# Patient Record
Sex: MALE | Race: WHITE | NOT HISPANIC OR LATINO | URBAN - METROPOLITAN AREA
[De-identification: names, ages, dates, MRNs, and addresses within clinical notes are randomized per-mention and may not be internally consistent; named-entity substitution may affect disease eponyms.]

---

## 2023-02-23 LAB
ALANINE AMINOTRANSFERASE (SGPT) (U/L) IN SER/PLAS: 16 U/L (ref 10–52)
ALBUMIN (G/DL) IN SER/PLAS: 5.4 G/DL (ref 3.4–5)
ALKALINE PHOSPHATASE (U/L) IN SER/PLAS: 83 U/L (ref 33–120)
ANION GAP IN SER/PLAS: 13 MMOL/L (ref 10–20)
ASPARTATE AMINOTRANSFERASE (SGOT) (U/L) IN SER/PLAS: 17 U/L (ref 9–39)
BILIRUBIN TOTAL (MG/DL) IN SER/PLAS: 0.6 MG/DL (ref 0–1.2)
CALCIDIOL (25 OH VITAMIN D3) (NG/ML) IN SER/PLAS: 17 NG/ML
CALCIUM (MG/DL) IN SER/PLAS: 10 MG/DL (ref 8.6–10.6)
CARBON DIOXIDE, TOTAL (MMOL/L) IN SER/PLAS: 29 MMOL/L (ref 21–32)
CHLORIDE (MMOL/L) IN SER/PLAS: 103 MMOL/L (ref 98–107)
CHOLESTEROL (MG/DL) IN SER/PLAS: 258 MG/DL (ref 0–199)
CHOLESTEROL IN HDL (MG/DL) IN SER/PLAS: 83.8 MG/DL
CHOLESTEROL/HDL RATIO: 3.1
CREATININE (MG/DL) IN SER/PLAS: 0.96 MG/DL (ref 0.5–1.3)
ERYTHROCYTE DISTRIBUTION WIDTH (RATIO) BY AUTOMATED COUNT: 11.9 % (ref 11.5–14.5)
ERYTHROCYTE MEAN CORPUSCULAR HEMOGLOBIN CONCENTRATION (G/DL) BY AUTOMATED: 34.3 G/DL (ref 32–36)
ERYTHROCYTE MEAN CORPUSCULAR VOLUME (FL) BY AUTOMATED COUNT: 95 FL (ref 80–100)
ERYTHROCYTES (10*6/UL) IN BLOOD BY AUTOMATED COUNT: 5.04 X10E12/L (ref 4.5–5.9)
GFR MALE: >90 ML/MIN/1.73M2
GLUCOSE (MG/DL) IN SER/PLAS: 103 MG/DL (ref 74–99)
HEMATOCRIT (%) IN BLOOD BY AUTOMATED COUNT: 47.8 % (ref 41–52)
HEMOGLOBIN (G/DL) IN BLOOD: 16.4 G/DL (ref 13.5–17.5)
LDL: 159 MG/DL (ref 0–99)
LEUKOCYTES (10*3/UL) IN BLOOD BY AUTOMATED COUNT: 8.6 X10E9/L (ref 4.4–11.3)
NRBC (PER 100 WBCS) BY AUTOMATED COUNT: 0 /100 WBC (ref 0–0)
PLATELETS (10*3/UL) IN BLOOD AUTOMATED COUNT: 278 X10E9/L (ref 150–450)
POTASSIUM (MMOL/L) IN SER/PLAS: 4.6 MMOL/L (ref 3.5–5.3)
PROTEIN TOTAL: 7.6 G/DL (ref 6.4–8.2)
SODIUM (MMOL/L) IN SER/PLAS: 140 MMOL/L (ref 136–145)
THYROTROPIN (MIU/L) IN SER/PLAS BY DETECTION LIMIT <= 0.05 MIU/L: 1.54 MIU/L (ref 0.44–3.98)
TRIGLYCERIDE (MG/DL) IN SER/PLAS: 76 MG/DL (ref 0–149)
UREA NITROGEN (MG/DL) IN SER/PLAS: 13 MG/DL (ref 6–23)
VLDL: 15 MG/DL (ref 0–40)

## 2023-03-04 PROBLEM — R37 SEXUAL DYSFUNCTION: Status: ACTIVE | Noted: 2023-03-04

## 2023-03-04 PROBLEM — K21.9 LARYNGOPHARYNGEAL REFLUX (LPR): Status: ACTIVE | Noted: 2023-03-04

## 2023-03-04 PROBLEM — M25.561 ACUTE PAIN OF RIGHT KNEE: Status: ACTIVE | Noted: 2023-03-04

## 2023-03-04 PROBLEM — S76.219A GROIN STRAIN: Status: ACTIVE | Noted: 2023-03-04

## 2023-03-04 PROBLEM — G89.29 CHRONIC LEFT SHOULDER PAIN: Status: ACTIVE | Noted: 2023-03-04

## 2023-03-04 PROBLEM — R97.20 ELEVATED PROSTATE SPECIFIC ANTIGEN (PSA): Status: ACTIVE | Noted: 2023-03-04

## 2023-03-04 PROBLEM — D22.9 BENIGN MOLE: Status: ACTIVE | Noted: 2023-03-04

## 2023-03-04 PROBLEM — J30.9 ALLERGIC RHINITIS: Status: ACTIVE | Noted: 2023-03-04

## 2023-03-04 PROBLEM — M79.643 HAND PAIN: Status: ACTIVE | Noted: 2023-03-04

## 2023-03-04 PROBLEM — S69.92XA FINGER INJURY, LEFT, INITIAL ENCOUNTER: Status: ACTIVE | Noted: 2023-03-04

## 2023-03-04 PROBLEM — R33.9 INCOMPLETE EMPTYING OF BLADDER: Status: ACTIVE | Noted: 2023-03-04

## 2023-03-04 PROBLEM — M25.512 CHRONIC LEFT SHOULDER PAIN: Status: ACTIVE | Noted: 2023-03-04

## 2023-03-04 PROBLEM — M79.645 PAIN OF FINGER OF LEFT HAND: Status: ACTIVE | Noted: 2023-03-04

## 2023-03-04 PROBLEM — R39.9 URINARY SYMPTOM OR SIGN: Status: ACTIVE | Noted: 2023-03-04

## 2023-03-04 PROBLEM — R06.09 DYSPNEA ON EXERTION: Status: ACTIVE | Noted: 2023-03-04

## 2023-03-04 RX ORDER — SILDENAFIL 50 MG/1
50 TABLET, FILM COATED ORAL
COMMUNITY
Start: 2021-04-22

## 2023-03-07 ENCOUNTER — OFFICE VISIT (OUTPATIENT)
Dept: PRIMARY CARE | Facility: CLINIC | Age: 43
End: 2023-03-07
Payer: COMMERCIAL

## 2023-03-07 VITALS
SYSTOLIC BLOOD PRESSURE: 120 MMHG | DIASTOLIC BLOOD PRESSURE: 70 MMHG | WEIGHT: 178 LBS | BODY MASS INDEX: 26.36 KG/M2 | HEIGHT: 69 IN

## 2023-03-07 DIAGNOSIS — E78.2 MIXED HYPERLIPIDEMIA: Primary | ICD-10-CM

## 2023-03-07 DIAGNOSIS — E55.9 VITAMIN D DEFICIENCY: ICD-10-CM

## 2023-03-07 PROCEDURE — 99213 OFFICE O/P EST LOW 20 MIN: CPT | Performed by: INTERNAL MEDICINE

## 2023-03-07 RX ORDER — ERGOCALCIFEROL 1.25 MG/1
50000 CAPSULE ORAL
Qty: 6 CAPSULE | Refills: 0 | Status: SHIPPED | OUTPATIENT
Start: 2023-03-07 | End: 2023-04-18

## 2023-03-08 NOTE — PROGRESS NOTES
"Subjective   Patient ID: Marty Pierce is a 43 y.o. male who presents for Follow-up.    HPI     Patient is here for follow-up on blood work  To quit smoking only socially smokes  Father had high cholesterol and throat cancer  Patient always had high cholesterol    Past recap  Patient is here for physical  Also wants refill on Viagra as he has performing issues  He traveled a lot last year he was drinking a lot not eating right and gained some weight now he has cut down on smoking has cut down on drinking but still drinks 1 drink per day  Concerned about some moles on his chest     for physical   having performance issue   skin breakout on face from facemask   derm removed few skin spots   Patient has urinary bladder neck dysfunction, he has seen the urologist  They recommended Botox or surgery patient opted not to go for either  He manages it well by just giving extra shakes    Patient is here with complaints of right knee pain   He injured it while playing volleyball but it was recovered completely from that injury when  Friday night he was playing volleyball and in each him back he felt a sharp pain in the right knee since then he is having pain and swelling  Sunday he was walking up the stairs and he suddenly heard a snap in the knee in the back  Since then he is having difficulty walking because of the pain this no swelling in the knee he is able to ambulate but just feels the pain particularly on going up the stairs    SH : smokes , ETOH 2 beers / day   Occ :  , stay active   FH: F eosophageal cancer ,M CAD     not exrecising since last year     Review of Systems    Objective   /70   Ht 1.753 m (5' 9\")   Wt 80.7 kg (178 lb)   BMI 26.29 kg/m²     Physical Exam  Vitals reviewed.   Constitutional:       Appearance: Normal appearance.   HENT:      Head: Normocephalic and atraumatic.      Right Ear: Tympanic membrane, ear canal and external ear normal.      Left Ear: Tympanic membrane, ear " canal and external ear normal.      Nose: Nose normal.      Mouth/Throat:      Pharynx: Oropharynx is clear.   Eyes:      Extraocular Movements: Extraocular movements intact.      Conjunctiva/sclera: Conjunctivae normal.      Pupils: Pupils are equal, round, and reactive to light.   Cardiovascular:      Rate and Rhythm: Normal rate and regular rhythm.      Pulses: Normal pulses.      Heart sounds: Normal heart sounds.   Pulmonary:      Effort: Pulmonary effort is normal.      Breath sounds: Normal breath sounds.   Abdominal:      General: Abdomen is flat. Bowel sounds are normal.      Palpations: Abdomen is soft.   Musculoskeletal:      Cervical back: Normal range of motion and neck supple.   Skin:     General: Skin is warm and dry.   Neurological:      General: No focal deficit present.      Mental Status: He is alert and oriented to person, place, and time.   Psychiatric:         Mood and Affect: Mood normal.         Assessment/Plan   Problem List Items Addressed This Visit    None  Visit Diagnoses       Mixed hyperlipidemia    -  Primary    Vitamin D deficiency        Relevant Medications    ergocalciferol (Vitamin D-2) 1.25 MG (10107 UT) capsule          Blood work results reviewed  Cholesterol is high but patient is not willing to go on medication  We treat with diet and exercise  Vitamin D deficiency  Treat with vitamin D 50,000 q. weekly for 3 months then over-the-counter 2000  Follow-up for physical in a year

## 2023-09-14 ENCOUNTER — HOSPITAL ENCOUNTER (OUTPATIENT)
Dept: DATA CONVERSION | Facility: HOSPITAL | Age: 43
Discharge: HOME | End: 2023-09-14
Payer: COMMERCIAL

## 2023-09-14 DIAGNOSIS — R10.11 RIGHT UPPER QUADRANT PAIN: ICD-10-CM

## 2023-09-14 LAB
ALBUMIN SERPL-MCNC: 4.8 GM/DL (ref 3.5–5)
ALBUMIN/GLOB SERPL: 2 RATIO (ref 1.5–3)
ALP BLD-CCNC: 70 U/L (ref 35–125)
ALT SERPL-CCNC: 23 U/L (ref 5–40)
ANION GAP SERPL CALCULATED.3IONS-SCNC: 10 MMOL/L (ref 0–19)
AST SERPL-CCNC: 19 U/L (ref 5–40)
BASOPHILS # BLD AUTO: 0.06 K/UL (ref 0–0.22)
BASOPHILS NFR BLD AUTO: 0.7 % (ref 0–1)
BILIRUB SERPL-MCNC: 0.4 MG/DL (ref 0.1–1.2)
BUN SERPL-MCNC: 15 MG/DL (ref 8–25)
BUN/CREAT SERPL: 15 RATIO (ref 8–21)
CALCIUM SERPL-MCNC: 9.8 MG/DL (ref 8.5–10.4)
CHLORIDE SERPL-SCNC: 101 MMOL/L (ref 97–107)
CO2 SERPL-SCNC: 27 MMOL/L (ref 24–31)
CREAT SERPL-MCNC: 1 MG/DL (ref 0.4–1.6)
D DIMER PPP FEU-MCNC: 0.19 MG/L FEU (ref 0.19–0.5)
DEPRECATED RDW RBC AUTO: 42.2 FL (ref 37–54)
DIFFERENTIAL METHOD BLD: NORMAL
EOSINOPHIL # BLD AUTO: 0.03 K/UL (ref 0–0.45)
EOSINOPHIL NFR BLD: 0.4 % (ref 0–3)
ERYTHROCYTE [DISTWIDTH] IN BLOOD BY AUTOMATED COUNT: 12.1 % (ref 11.7–15)
GFR SERPL CREATININE-BSD FRML MDRD: 95 ML/MIN/1.73 M2
GLOBULIN SER-MCNC: 2.4 G/DL (ref 1.9–3.7)
GLUCOSE SERPL-MCNC: 115 MG/DL (ref 65–99)
HCT VFR BLD AUTO: 43.9 % (ref 41–50)
HGB BLD-MCNC: 15.4 GM/DL (ref 13.5–16.5)
IMM GRANULOCYTES # BLD AUTO: 0.02 K/UL (ref 0–0.1)
LIPASE SERPL-CCNC: 34 U/L (ref 16–63)
LYMPHOCYTES # BLD AUTO: 2.22 K/UL (ref 1.2–3.2)
LYMPHOCYTES NFR BLD MANUAL: 26.5 % (ref 20–40)
MCH RBC QN AUTO: 33.3 PG (ref 26–34)
MCHC RBC AUTO-ENTMCNC: 35.1 % (ref 31–37)
MCV RBC AUTO: 95 FL (ref 80–100)
MONOCYTES # BLD AUTO: 0.67 K/UL (ref 0–0.8)
MONOCYTES NFR BLD MANUAL: 8 % (ref 0–8)
NEUTROPHILS # BLD AUTO: 5.37 K/UL
NEUTROPHILS # BLD AUTO: 5.37 K/UL (ref 1.8–7.7)
NEUTROPHILS.IMMATURE NFR BLD: 0.2 % (ref 0–1)
NEUTS SEG NFR BLD: 64.2 % (ref 50–70)
NRBC BLD-RTO: 0 /100 WBC
PLATELET # BLD AUTO: 232 K/UL (ref 150–450)
PMV BLD AUTO: 9.4 CU (ref 7–12.6)
POTASSIUM SERPL-SCNC: 4 MMOL/L (ref 3.4–5.1)
PROT SERPL-MCNC: 7.2 G/DL (ref 5.9–7.9)
RBC # BLD AUTO: 4.62 M/UL (ref 4.5–5.5)
SODIUM SERPL-SCNC: 138 MMOL/L (ref 133–145)
WBC # BLD AUTO: 8.4 K/UL (ref 4.5–11)

## 2024-03-08 ENCOUNTER — OFFICE VISIT (OUTPATIENT)
Dept: PRIMARY CARE | Facility: CLINIC | Age: 44
End: 2024-03-08
Payer: COMMERCIAL

## 2024-03-08 VITALS
WEIGHT: 172.6 LBS | SYSTOLIC BLOOD PRESSURE: 130 MMHG | DIASTOLIC BLOOD PRESSURE: 82 MMHG | HEIGHT: 69 IN | BODY MASS INDEX: 25.56 KG/M2

## 2024-03-08 DIAGNOSIS — E55.9 VITAMIN D DEFICIENCY: ICD-10-CM

## 2024-03-08 DIAGNOSIS — R10.9 ABDOMINAL PAIN, UNSPECIFIED ABDOMINAL LOCATION: ICD-10-CM

## 2024-03-08 DIAGNOSIS — M54.50 ACUTE BILATERAL LOW BACK PAIN WITHOUT SCIATICA: ICD-10-CM

## 2024-03-08 DIAGNOSIS — R21 RASH: ICD-10-CM

## 2024-03-08 DIAGNOSIS — F32.89 OTHER DEPRESSION: ICD-10-CM

## 2024-03-08 PROCEDURE — 99214 OFFICE O/P EST MOD 30 MIN: CPT | Performed by: INTERNAL MEDICINE

## 2024-03-08 RX ORDER — NYSTATIN 100000 U/G
CREAM TOPICAL 2 TIMES DAILY
Qty: 30 G | Refills: 0 | Status: SHIPPED | OUTPATIENT
Start: 2024-03-08 | End: 2024-03-18

## 2024-03-08 RX ORDER — ERGOCALCIFEROL 1.25 MG/1
50000 CAPSULE ORAL
Qty: 6 CAPSULE | Refills: 0 | Status: SHIPPED | OUTPATIENT
Start: 2024-03-08 | End: 2024-04-19

## 2024-03-14 DIAGNOSIS — F32.89 OTHER DEPRESSION: ICD-10-CM

## 2024-03-14 RX ORDER — PAROXETINE 10 MG/1
10 TABLET, FILM COATED ORAL EVERY MORNING
Qty: 30 TABLET | Refills: 1 | Status: SHIPPED | OUTPATIENT
Start: 2024-03-14 | End: 2024-04-05 | Stop reason: SDUPTHER

## 2024-03-18 ENCOUNTER — OFFICE VISIT (OUTPATIENT)
Dept: GASTROENTEROLOGY | Facility: CLINIC | Age: 44
End: 2024-03-18
Payer: COMMERCIAL

## 2024-03-18 VITALS
RESPIRATION RATE: 18 BRPM | TEMPERATURE: 98.2 F | OXYGEN SATURATION: 99 % | WEIGHT: 176.26 LBS | DIASTOLIC BLOOD PRESSURE: 85 MMHG | HEART RATE: 79 BPM | BODY MASS INDEX: 26.03 KG/M2 | SYSTOLIC BLOOD PRESSURE: 125 MMHG

## 2024-03-18 DIAGNOSIS — G89.29 ABDOMINAL PAIN, CHRONIC, RIGHT UPPER QUADRANT: ICD-10-CM

## 2024-03-18 DIAGNOSIS — R13.19 ESOPHAGEAL DYSPHAGIA: ICD-10-CM

## 2024-03-18 DIAGNOSIS — Z12.11 ENCOUNTER FOR SCREENING COLONOSCOPY: Primary | ICD-10-CM

## 2024-03-18 DIAGNOSIS — K21.9 GASTROESOPHAGEAL REFLUX DISEASE, UNSPECIFIED WHETHER ESOPHAGITIS PRESENT: ICD-10-CM

## 2024-03-18 DIAGNOSIS — R11.10 DRY HEAVES: ICD-10-CM

## 2024-03-18 DIAGNOSIS — R10.11 ABDOMINAL PAIN, CHRONIC, RIGHT UPPER QUADRANT: ICD-10-CM

## 2024-03-18 DIAGNOSIS — R10.9 ABDOMINAL PAIN, UNSPECIFIED ABDOMINAL LOCATION: ICD-10-CM

## 2024-03-18 PROCEDURE — 1036F TOBACCO NON-USER: CPT | Performed by: NURSE PRACTITIONER

## 2024-03-18 PROCEDURE — 99204 OFFICE O/P NEW MOD 45 MIN: CPT | Performed by: NURSE PRACTITIONER

## 2024-03-18 RX ORDER — POLYETHYLENE GLYCOL 3350 17 G/17G
238 POWDER, FOR SOLUTION ORAL ONCE
Qty: 238 G | Refills: 0 | Status: SHIPPED | OUTPATIENT
Start: 2024-03-18 | End: 2024-03-18

## 2024-03-18 RX ORDER — OMEPRAZOLE 20 MG/1
20 CAPSULE, DELAYED RELEASE ORAL
Qty: 30 CAPSULE | Refills: 2 | Status: SHIPPED | OUTPATIENT
Start: 2024-03-18

## 2024-03-18 RX ORDER — CALCIUM CARBONATE 200(500)MG
1 TABLET,CHEWABLE ORAL AS NEEDED
COMMUNITY

## 2024-03-18 RX ORDER — MINERAL OIL
180 ENEMA (ML) RECTAL DAILY
COMMUNITY

## 2024-03-18 ASSESSMENT — ENCOUNTER SYMPTOMS
WEAKNESS: 0
AGITATION: 0
FLANK PAIN: 0
SHORTNESS OF BREATH: 0
JOINT SWELLING: 0
SORE THROAT: 0
COUGH: 0
MYALGIAS: 0
EYE PAIN: 0
HEADACHES: 0
HALLUCINATIONS: 0
ARTHRALGIAS: 0
FEVER: 0
DIZZINESS: 0
BACK PAIN: 0
NUMBNESS: 0
DIAPHORESIS: 0
DYSURIA: 0
PALPITATIONS: 0
PHOTOPHOBIA: 0
LIGHT-HEADEDNESS: 0
CHILLS: 0
WHEEZING: 0
HEMATURIA: 0
FREQUENCY: 0
ADENOPATHY: 0
FATIGUE: 0

## 2024-03-18 NOTE — PATIENT INSTRUCTIONS
Thanks for coming to the GI clinic.     Start omeprazole 20 mg once daily (30-60 minutes prior to breakfast).    You can use TUMS as needed.     I would like you to get an EGD.     You are due for a screening colonoscopy.   Please read all of the instructions 7 days before your colonoscopy.   You will need to take ONLY clear liquids the ENTIRE day before your procedure. These include (clear fruit juices, soda, Gatorade, broth, jello and coffee/tea) Avoid red and purple drinks. No cream or milk in the coffee.   You will need to take a bowel preparation.   You will also need a .      Please call [phone] to schedule the above procedures.     Moderate alcohol intake.     Tobacco can cause acid reflux.     Follow up 3 weeks after completion of the above.

## 2024-03-18 NOTE — PROGRESS NOTES
"Subjective   Patient ID: Marty Pierce is a 44 y.o. male who presents for colonoscopy consult and digestive issues.     This is a 44 year old WM with history of tobacco/daily alcohol use, depression, and seizures (none since 2005) who is presenting to the GI clinic for an initial visit.     History per pt and review of EMR    Denies prior EGD and colonoscopy.     Reports since last year he's been having RUQ abdominal pain lasting a whole day at a time. The pain is not related to food intake. The pain is much better than last year. The pain is worse with stress. He was seen in the ED last year for evaluation where gallbladder US was normal.     Reports recently starting Paxil and he is hopeful this may help his abdominal pain.     ROS positive for esophageal dysphagia to solids foods.     ROS positive for heartburn once a week which is controlled with TUMS. Heartburn is worse if he's lying flat. He has to sleep upright to prevent heartburn. He was on a PPI in the past.     He endorses a year's worth of dry heaving in the morning.     Denies unintentional weight loss, regurgitation, odynophagia, nausea, diarrhea, constipation, hematemesis, hematochezia, and melena.       Past medical history:   See above    Past surgical history:   Open repair with augmented reconstruction of left ulnar collateral ligament (2017 )     Family history:   Father- throat vs esophageal cancer from acid reflux     Social history:  Drinks 1-2 alcoholic beverages per day with \"a little more\" on the weekends  Quit smoking cigarettes 3-4 years ago   Smokes a cigar on occasion   Denies use of illicit drugs   Works a desk job as a        Review of Systems   Constitutional:  Negative for chills, diaphoresis, fatigue and fever.   HENT:  Negative for congestion, ear pain, hearing loss, sneezing and sore throat.    Eyes:  Negative for photophobia, pain and visual disturbance.   Respiratory:  Negative for cough, shortness of breath " and wheezing.    Cardiovascular:  Negative for chest pain, palpitations and leg swelling.   Endocrine: Negative for cold intolerance and heat intolerance.   Genitourinary:  Negative for dysuria, flank pain, frequency and hematuria.   Musculoskeletal:  Negative for arthralgias, back pain, gait problem, joint swelling and myalgias.   Skin:  Negative for rash.   Neurological:  Negative for dizziness, syncope, weakness, light-headedness, numbness and headaches.   Hematological:  Negative for adenopathy.   Psychiatric/Behavioral:  Negative for agitation and hallucinations.        Allergies   Allergen Reactions    Grass Pollen Other     Eyes itching, nose running       Current Outpatient Medications   Medication Sig Dispense Refill    calcium carbonate (Tums) 200 mg calcium chewable tablet Chew 1 tablet (500 mg) if needed for indigestion or heartburn.      ergocalciferol (Vitamin D-2) 1.25 MG (68193 UT) capsule Take 1 capsule (50,000 Units) by mouth 1 (one) time per week. 6 capsule 0    fexofenadine (Allegra) 180 mg tablet Take 1 tablet (180 mg) by mouth once daily.      omeprazole (PriLOSEC) 20 mg DR capsule Take 1 capsule (20 mg) by mouth once daily in the morning. Take before meals. To be taken 30-60 minutes prior to breakfast. Do not crush or chew. 30 capsule 2    PARoxetine (Paxil) 10 mg tablet Take 1 tablet (10 mg) by mouth once daily in the morning. 30 tablet 1    polyethylene glycol (Glycolax, Miralax) 17 gram/dose powder Take 238 g by mouth 1 time for 1 dose. Use as directed by  endoscopy department for colonoscopy 238 g 0    sildenafil (Viagra) 50 mg tablet Take 1 tablet (50 mg) by mouth. 1 HOUR BEFORE Activity       No current facility-administered medications for this visit.      Objective     /85 (BP Location: Right arm)   Pulse 79   Temp 36.8 °C (98.2 °F)   Resp 18   Wt 79.9 kg (176 lb 4.1 oz)   SpO2 99%   BMI 26.03 kg/m²      Physical Exam  Constitutional:       General: He is not in acute  distress.     Appearance: Normal appearance.   HENT:      Head: Normocephalic and atraumatic.   Eyes:      Conjunctiva/sclera: Conjunctivae normal.   Cardiovascular:      Rate and Rhythm: Normal rate and regular rhythm.      Heart sounds: No murmur heard.     No gallop.   Pulmonary:      Effort: Pulmonary effort is normal.      Breath sounds: Normal breath sounds.   Abdominal:      General: Bowel sounds are normal. There is no distension.      Tenderness: There is no abdominal tenderness. There is no guarding.   Musculoskeletal:         General: No swelling or deformity. Normal range of motion.      Cervical back: Normal range of motion. No rigidity.   Skin:     General: Skin is warm and dry.      Coloration: Skin is not jaundiced.      Findings: No lesion or rash.   Neurological:      General: No focal deficit present.      Mental Status: He is alert and oriented to person, place, and time.   Psychiatric:         Mood and Affect: Mood normal.         Assessment/Plan   Problem List Items Addressed This Visit    None  Visit Diagnoses       Encounter for screening colonoscopy    -  Primary    Relevant Medications    polyethylene glycol (Glycolax, Miralax) 17 gram/dose powder    Other Relevant Orders    Colonoscopy Screening; Average Risk Patient    Abdominal pain, unspecified abdominal location        Gastroesophageal reflux disease, unspecified whether esophagitis present        Relevant Medications    omeprazole (PriLOSEC) 20 mg DR capsule    Other Relevant Orders    EGD    Esophageal dysphagia        Relevant Orders    EGD    Abdominal pain, chronic, right upper quadrant        Dry heaves               GERD with esophageal dysphagia and dry heaving:   - will proceed with EGD to evaluate for any GERD related complication such as esophagitis and peptic stricture  - start omeprazole 20 mg once daily (30-60 minutes prior to breakfast)  - continue TUMS as needed  - recommend moderation of alcohol intake  - recommend  tobacco cessation    2. Colorectal cancer screening:  - will proceed with screening colonoscopy   - Miralax Gatorade split bowel prep     3. Chronic RUQ abdominal pain: overall do not suspect GI etiology. Likely functional in nature and related to stress. I'm pleased to see he started Paxil as it does have anxiolytic properties.   - EGD as above    4. Follow up:  - return to clinic 3 weeks after the completion of EGD and colonoscopy

## 2024-03-30 NOTE — PROGRESS NOTES
Subjective   Patient ID: Marty Pierce is a 44 y.o. male who presents for Annual Exam.    HPI   Patient is here for follow-up about a issues to address  Having high stress situation.  Feeling very depressed.  Very irritated easily.  In early 20s took Paxil.  But did not like the effects  Quit smoking but still drinks beer every day more on weekends  Complaining of having rash in the groin  Having low back pain  Also having a lot of acid reflux       Past recap   patient is here for follow-up on blood work  To quit smoking only socially smokes  Father had high cholesterol and throat cancer  Patient always had high cholesterol    Patient is here for physical  Also wants refill on Viagra as he has performing issues  He traveled a lot last year he was drinking a lot not eating right and gained some weight now he has cut down on smoking has cut down on drinking but still drinks 1 drink per day  Concerned about some moles on his chest      for physical   having performance issue   skin breakout on face from facemask   derm removed few skin spots   Patient has urinary bladder neck dysfunction, he has seen the urologist  They recommended Botox or surgery patient opted not to go for either  He manages it well by just giving extra shakes     Patient is here with complaints of right knee pain   He injured it while playing volleyball but it was recovered completely from that injury when  Friday night he was playing volleyball and in each him back he felt a sharp pain in the right knee since then he is having pain and swelling  Sunday he was walking up the stairs and he suddenly heard a snap in the knee in the back  Since then he is having difficulty walking because of the pain this no swelling in the knee he is able to ambulate but just feels the pain particularly on going up the stairs     SH : smokes , ETOH 2 beers / day   Occ :  , stay active   FH: F eosophageal cancer ,M CAD      not exrecising since last  "year   Review of Systems    Objective   /82   Ht 1.753 m (5' 9\")   Wt 78.3 kg (172 lb 9.6 oz)   BMI 25.49 kg/m²     Physical Exam  Vitals reviewed.   Constitutional:       Appearance: Normal appearance.   HENT:      Head: Normocephalic and atraumatic.      Right Ear: Tympanic membrane, ear canal and external ear normal.      Left Ear: Tympanic membrane, ear canal and external ear normal.      Nose: Nose normal.      Mouth/Throat:      Pharynx: Oropharynx is clear.   Eyes:      Extraocular Movements: Extraocular movements intact.      Conjunctiva/sclera: Conjunctivae normal.      Pupils: Pupils are equal, round, and reactive to light.   Cardiovascular:      Rate and Rhythm: Normal rate and regular rhythm.      Pulses: Normal pulses.      Heart sounds: Normal heart sounds.   Pulmonary:      Effort: Pulmonary effort is normal.      Breath sounds: Normal breath sounds.   Abdominal:      General: Abdomen is flat. Bowel sounds are normal.      Palpations: Abdomen is soft.   Musculoskeletal:      Cervical back: Normal range of motion and neck supple.   Skin:     General: Skin is warm and dry.   Neurological:      General: No focal deficit present.      Mental Status: He is alert and oriented to person, place, and time.   Psychiatric:         Mood and Affect: Mood normal.         Assessment/Plan   Problem List Items Addressed This Visit    None  Visit Diagnoses         Codes    Other depression     F32.89    Relevant Orders    Referral to Psychology    Abdominal pain, unspecified abdominal location     R10.9    Relevant Orders    Referral to Gastroenterology    Acute bilateral low back pain without sciatica     M54.50    Relevant Orders    XR lumbar spine complete 4+ views    Rash     R21    Vitamin D deficiency     E55.9    Relevant Medications    ergocalciferol (Vitamin D-2) 1.25 MG (62213 UT) capsule        Past recap  Blood work results reviewed  Cholesterol is high but patient is not willing to go on " medication  We treat with diet and exercise  Vitamin D deficiency  Treat with vitamin D 50,000 q. weekly for 3 months then over-the-counter 2000  Follow-up for physical in a year    Past recap  Will get x-ray of the lower back  Discussed exercises  Nystatin cream for fungal rash  Vitamin D prescription given  Refer to GI for endoscopy for GERD also colonoscopy for depression will try Trintellix  Refer to counseling  Follow-up in a month

## 2024-04-05 ENCOUNTER — OFFICE VISIT (OUTPATIENT)
Dept: PRIMARY CARE | Facility: CLINIC | Age: 44
End: 2024-04-05
Payer: COMMERCIAL

## 2024-04-05 VITALS
DIASTOLIC BLOOD PRESSURE: 80 MMHG | WEIGHT: 170 LBS | SYSTOLIC BLOOD PRESSURE: 124 MMHG | HEIGHT: 69 IN | BODY MASS INDEX: 25.18 KG/M2

## 2024-04-05 DIAGNOSIS — F32.89 OTHER DEPRESSION: ICD-10-CM

## 2024-04-05 PROCEDURE — 1036F TOBACCO NON-USER: CPT | Performed by: INTERNAL MEDICINE

## 2024-04-05 PROCEDURE — 99213 OFFICE O/P EST LOW 20 MIN: CPT | Performed by: INTERNAL MEDICINE

## 2024-04-05 RX ORDER — PAROXETINE 10 MG/1
10 TABLET, FILM COATED ORAL EVERY MORNING
Qty: 90 TABLET | Refills: 0 | Status: SHIPPED | OUTPATIENT
Start: 2024-04-05 | End: 2024-07-04

## 2024-04-05 NOTE — PROGRESS NOTES
Subjective   Patient ID: Marty Pierce is a 44 y.o. male who presents for Follow-up.    HPI   Patient is here for follow-up  Trintellix was very expensive on his insurance.  He started Paxil but it is making him very tired  He has a lot of stress at work which triggered his depression.  He used to be on 4 different his pressure medication in his 20s for borderline personality and depression and he was doing well until now.  He does weekend heavy drinking     Past recap   patient is here for follow-up about a issues to address  Having high stress situation.  Feeling very depressed.  Very irritated easily.  In early 20s took Paxil.  But did not like the effects  Quit smoking but still drinks beer every day more on weekends  Complaining of having rash in the groin  Having low back pain  Also having a lot of acid reflux       Past recap   patient is here for follow-up on blood work  To quit smoking only socially smokes  Father had high cholesterol and throat cancer  Patient always had high cholesterol    Patient is here for physical  Also wants refill on Viagra as he has performing issues  He traveled a lot last year he was drinking a lot not eating right and gained some weight now he has cut down on smoking has cut down on drinking but still drinks 1 drink per day  Concerned about some moles on his chest      for physical   having performance issue   skin breakout on face from facemask   derm removed few skin spots   Patient has urinary bladder neck dysfunction, he has seen the urologist  They recommended Botox or surgery patient opted not to go for either  He manages it well by just giving extra shakes     Patient is here with complaints of right knee pain   He injured it while playing volleyball but it was recovered completely from that injury when  Friday night he was playing volleyball and in each him back he felt a sharp pain in the right knee since then he is having pain and swelling  Sunday he was walking up the  "stairs and he suddenly heard a snap in the knee in the back  Since then he is having difficulty walking because of the pain this no swelling in the knee he is able to ambulate but just feels the pain particularly on going up the stairs     SH : smokes , ETOH 2 beers / day   Occ :  , stay active   FH: F eosophageal cancer ,M CAD      not exrecising since last year   Review of Systems    Objective   /80   Ht 1.753 m (5' 9\")   Wt 77.1 kg (170 lb)   BMI 25.10 kg/m²     Physical Exam  Vitals reviewed.   Constitutional:       Appearance: Normal appearance.   HENT:      Head: Normocephalic and atraumatic.      Right Ear: Tympanic membrane, ear canal and external ear normal.      Left Ear: Tympanic membrane, ear canal and external ear normal.      Nose: Nose normal.      Mouth/Throat:      Pharynx: Oropharynx is clear.   Eyes:      Extraocular Movements: Extraocular movements intact.      Conjunctiva/sclera: Conjunctivae normal.      Pupils: Pupils are equal, round, and reactive to light.   Cardiovascular:      Rate and Rhythm: Normal rate and regular rhythm.      Pulses: Normal pulses.      Heart sounds: Normal heart sounds.   Pulmonary:      Effort: Pulmonary effort is normal.      Breath sounds: Normal breath sounds.   Abdominal:      General: Abdomen is flat. Bowel sounds are normal.      Palpations: Abdomen is soft.   Musculoskeletal:      Cervical back: Normal range of motion and neck supple.   Skin:     General: Skin is warm and dry.   Neurological:      General: No focal deficit present.      Mental Status: He is alert and oriented to person, place, and time.   Psychiatric:         Mood and Affect: Mood normal.         Assessment/Plan   Problem List Items Addressed This Visit    None  Visit Diagnoses         Codes    Other depression     F32.89    Relevant Medications    PARoxetine (Paxil) 10 mg tablet        Past recap  Blood work results reviewed  Cholesterol is high but patient is not " willing to go on medication  We treat with diet and exercise  Vitamin D deficiency  Treat with vitamin D 50,000 q. weekly for 3 months then over-the-counter 2000  Follow-up for physical in a year    Past recap  Will get x-ray of the lower back  Discussed exercises  Nystatin cream for fungal rash  Vitamin D prescription given  Refer to GI for endoscopy for GERD also colonoscopy for depression will try Trintellix  Refer to counseling  Follow-up in a month    4/5/2024  Advised patient to take Flexeril at night if dose is too high he can start taking half the dose  Again encouraged to see counselor  Patient has a TeleDoc appointment  Follow-up in 3 months

## 2024-06-28 ENCOUNTER — APPOINTMENT (OUTPATIENT)
Dept: PRIMARY CARE | Facility: CLINIC | Age: 44
End: 2024-06-28
Payer: COMMERCIAL

## 2024-07-02 ENCOUNTER — LAB (OUTPATIENT)
Dept: LAB | Facility: LAB | Age: 44
End: 2024-07-02
Payer: COMMERCIAL

## 2024-07-02 ENCOUNTER — APPOINTMENT (OUTPATIENT)
Dept: PRIMARY CARE | Facility: CLINIC | Age: 44
End: 2024-07-02
Payer: COMMERCIAL

## 2024-07-02 VITALS
DIASTOLIC BLOOD PRESSURE: 56 MMHG | WEIGHT: 178 LBS | HEIGHT: 69 IN | BODY MASS INDEX: 26.36 KG/M2 | SYSTOLIC BLOOD PRESSURE: 112 MMHG

## 2024-07-02 DIAGNOSIS — E55.9 VITAMIN D DEFICIENCY: ICD-10-CM

## 2024-07-02 DIAGNOSIS — Z00.00 PHYSICAL EXAM, ANNUAL: Primary | ICD-10-CM

## 2024-07-02 DIAGNOSIS — E78.2 MIXED HYPERLIPIDEMIA: ICD-10-CM

## 2024-07-02 PROBLEM — Z86.59 HISTORY OF DEPRESSION: Status: ACTIVE | Noted: 2024-07-02

## 2024-07-02 PROBLEM — R56.9 SEIZURE (MULTI): Status: ACTIVE | Noted: 2024-07-02

## 2024-07-02 PROBLEM — J02.9 SORE THROAT: Status: ACTIVE | Noted: 2024-07-02

## 2024-07-02 PROBLEM — L91.8 OTHER HYPERTROPHIC DISORDERS OF THE SKIN: Status: ACTIVE | Noted: 2023-07-12

## 2024-07-02 PROBLEM — L81.4 OTHER MELANIN HYPERPIGMENTATION: Status: ACTIVE | Noted: 2023-07-12

## 2024-07-02 PROBLEM — N52.9 INABILITY TO ATTAIN ERECTION: Status: ACTIVE | Noted: 2024-07-02

## 2024-07-02 PROBLEM — L21.9 SEBORRHEIC DERMATITIS, UNSPECIFIED: Status: ACTIVE | Noted: 2020-07-29

## 2024-07-02 PROBLEM — L71.9 ROSACEA, UNSPECIFIED: Status: ACTIVE | Noted: 2020-07-29

## 2024-07-02 PROBLEM — D18.01 HEMANGIOMA OF SKIN AND SUBCUTANEOUS TISSUE: Status: ACTIVE | Noted: 2023-07-12

## 2024-07-02 PROBLEM — L57.0 ACTINIC KERATOSIS: Status: ACTIVE | Noted: 2020-07-29

## 2024-07-02 LAB
25(OH)D3 SERPL-MCNC: 32 NG/ML (ref 30–100)
ALBUMIN SERPL BCP-MCNC: 4.9 G/DL (ref 3.4–5)
ALP SERPL-CCNC: 67 U/L (ref 33–120)
ALT SERPL W P-5'-P-CCNC: 16 U/L (ref 10–52)
ANION GAP SERPL CALC-SCNC: 12 MMOL/L (ref 10–20)
AST SERPL W P-5'-P-CCNC: 17 U/L (ref 9–39)
BILIRUB SERPL-MCNC: 0.8 MG/DL (ref 0–1.2)
BUN SERPL-MCNC: 17 MG/DL (ref 6–23)
CALCIUM SERPL-MCNC: 9.8 MG/DL (ref 8.6–10.6)
CHLORIDE SERPL-SCNC: 101 MMOL/L (ref 98–107)
CHOLEST SERPL-MCNC: 243 MG/DL (ref 0–199)
CHOLESTEROL/HDL RATIO: 2.8
CO2 SERPL-SCNC: 30 MMOL/L (ref 21–32)
CREAT SERPL-MCNC: 0.97 MG/DL (ref 0.5–1.3)
EGFRCR SERPLBLD CKD-EPI 2021: >90 ML/MIN/1.73M*2
ERYTHROCYTE [DISTWIDTH] IN BLOOD BY AUTOMATED COUNT: 11.8 % (ref 11.5–14.5)
GLUCOSE SERPL-MCNC: 101 MG/DL (ref 74–99)
HCT VFR BLD AUTO: 46.1 % (ref 41–52)
HDLC SERPL-MCNC: 86.9 MG/DL
HGB BLD-MCNC: 15.4 G/DL (ref 13.5–17.5)
LDLC SERPL CALC-MCNC: 142 MG/DL
MCH RBC QN AUTO: 32.2 PG (ref 26–34)
MCHC RBC AUTO-ENTMCNC: 33.4 G/DL (ref 32–36)
MCV RBC AUTO: 96 FL (ref 80–100)
NON HDL CHOLESTEROL: 156 MG/DL (ref 0–149)
NRBC BLD-RTO: 0 /100 WBCS (ref 0–0)
PLATELET # BLD AUTO: 246 X10*3/UL (ref 150–450)
POTASSIUM SERPL-SCNC: 4.4 MMOL/L (ref 3.5–5.3)
PROT SERPL-MCNC: 7.5 G/DL (ref 6.4–8.2)
RBC # BLD AUTO: 4.78 X10*6/UL (ref 4.5–5.9)
SODIUM SERPL-SCNC: 139 MMOL/L (ref 136–145)
TRIGL SERPL-MCNC: 73 MG/DL (ref 0–149)
TSH SERPL-ACNC: 1.78 MIU/L (ref 0.44–3.98)
VLDL: 15 MG/DL (ref 0–40)
WBC # BLD AUTO: 5.7 X10*3/UL (ref 4.4–11.3)

## 2024-07-02 PROCEDURE — 3008F BODY MASS INDEX DOCD: CPT | Performed by: INTERNAL MEDICINE

## 2024-07-02 PROCEDURE — 82306 VITAMIN D 25 HYDROXY: CPT

## 2024-07-02 PROCEDURE — 80061 LIPID PANEL: CPT

## 2024-07-02 PROCEDURE — 80053 COMPREHEN METABOLIC PANEL: CPT

## 2024-07-02 PROCEDURE — 84443 ASSAY THYROID STIM HORMONE: CPT

## 2024-07-02 PROCEDURE — 99396 PREV VISIT EST AGE 40-64: CPT | Performed by: INTERNAL MEDICINE

## 2024-07-02 PROCEDURE — 36415 COLL VENOUS BLD VENIPUNCTURE: CPT

## 2024-07-02 PROCEDURE — 85027 COMPLETE CBC AUTOMATED: CPT

## 2024-07-02 ASSESSMENT — ENCOUNTER SYMPTOMS
OCCASIONAL FEELINGS OF UNSTEADINESS: 0
LOSS OF SENSATION IN FEET: 0
DEPRESSION: 0

## 2024-07-02 NOTE — PROGRESS NOTES
Subjective   Patient ID: Marty Pierce is a 44 y.o. male who presents for cpe.    HPI   Here for physical  Quit taking Paxil  Was not helping  Doing increasing workout and walking more  Colonoscopy scheduled for August     Past recap   patient is here for follow-up  Trintellix was very expensive on his insurance.  He started Paxil but it is making him very tired  He has a lot of stress at work which triggered his depression.  He used to be on 4 different his pressure medication in his 20s for borderline personality and depression and he was doing well until now.  He does weekend heavy drinking     Past recap   patient is here for follow-up about a issues to address  Having high stress situation.  Feeling very depressed.  Very irritated easily.  In early 20s took Paxil.  But did not like the effects  Quit smoking but still drinks beer every day more on weekends  Complaining of having rash in the groin  Having low back pain  Also having a lot of acid reflux       Past recap   patient is here for follow-up on blood work  To quit smoking only socially smokes  Father had high cholesterol and throat cancer  Patient always had high cholesterol    Patient is here for physical  Also wants refill on Viagra as he has performing issues  He traveled a lot last year he was drinking a lot not eating right and gained some weight now he has cut down on smoking has cut down on drinking but still drinks 1 drink per day  Concerned about some moles on his chest      for physical   having performance issue   skin breakout on face from facemask   derm removed few skin spots   Patient has urinary bladder neck dysfunction, he has seen the urologist  They recommended Botox or surgery patient opted not to go for either  He manages it well by just giving extra shakes     Patient is here with complaints of right knee pain   He injured it while playing volleyball but it was recovered completely from that injury when  Friday night he was playing  "volleyball and in each him back he felt a sharp pain in the right knee since then he is having pain and swelling  Sunday he was walking up the stairs and he suddenly heard a snap in the knee in the back  Since then he is having difficulty walking because of the pain this no swelling in the knee he is able to ambulate but just feels the pain particularly on going up the stairs     SH : smokes , ETOH 2 beers / day   Occ :  , stay active   FH: F eosophageal cancer ,M CAD      not exrecising since last year   Review of Systems    Objective   /56   Ht 1.753 m (5' 9\")   Wt 80.7 kg (178 lb)   BMI 26.29 kg/m²     Physical Exam  Vitals reviewed.   Constitutional:       Appearance: Normal appearance.   HENT:      Head: Normocephalic and atraumatic.      Right Ear: Tympanic membrane, ear canal and external ear normal.      Left Ear: Tympanic membrane, ear canal and external ear normal.      Nose: Nose normal.      Mouth/Throat:      Pharynx: Oropharynx is clear.   Eyes:      Extraocular Movements: Extraocular movements intact.      Conjunctiva/sclera: Conjunctivae normal.      Pupils: Pupils are equal, round, and reactive to light.   Cardiovascular:      Rate and Rhythm: Normal rate and regular rhythm.      Pulses: Normal pulses.      Heart sounds: Normal heart sounds.   Pulmonary:      Effort: Pulmonary effort is normal.      Breath sounds: Normal breath sounds.   Abdominal:      General: Abdomen is flat. Bowel sounds are normal.      Palpations: Abdomen is soft.   Musculoskeletal:      Cervical back: Normal range of motion and neck supple.   Skin:     General: Skin is warm and dry.   Neurological:      General: No focal deficit present.      Mental Status: He is alert and oriented to person, place, and time.   Psychiatric:         Mood and Affect: Mood normal.         Assessment/Plan   Problem List Items Addressed This Visit    None  Visit Diagnoses         Codes    Physical exam, annual    -  " Primary Z00.00    Vitamin D deficiency     E55.9    Relevant Orders    Vitamin D 25-Hydroxy,Total (for eval of Vitamin D levels) (Completed)    Mixed hyperlipidemia     E78.2    Relevant Orders    CBC (Completed)    Comprehensive Metabolic Panel (Completed)    Lipid Panel (Completed)    Thyroid Stimulating Hormone (Completed)          Past recap  Blood work results reviewed  Cholesterol is high but patient is not willing to go on medication  We treat with diet and exercise  Vitamin D deficiency  Treat with vitamin D 50,000 q. weekly for 3 months then over-the-counter 2000  Follow-up for physical in a year    Past recap  Will get x-ray of the lower back  Discussed exercises  Nystatin cream for fungal rash  Vitamin D prescription given  Refer to GI for endoscopy for GERD also colonoscopy for depression will try Trintellix  Refer to counseling  Follow-up in a month    4/5/2024  Advised patient to take Flexeril at night if dose is too high he can start taking half the dose  Again encouraged to see counselor  Patient has a TeleDoc appointment  Follow-up in 3 months    7/2/2024  Physical normal  Routine blood work ordered  Continue diet and exercise  Schedule for colonoscopy

## 2024-08-06 ENCOUNTER — APPOINTMENT (OUTPATIENT)
Dept: GASTROENTEROLOGY | Facility: HOSPITAL | Age: 44
End: 2024-08-06
Payer: COMMERCIAL

## 2024-09-17 RX ORDER — SODIUM CHLORIDE, SODIUM LACTATE, POTASSIUM CHLORIDE, CALCIUM CHLORIDE 600; 310; 30; 20 MG/100ML; MG/100ML; MG/100ML; MG/100ML
20 INJECTION, SOLUTION INTRAVENOUS CONTINUOUS
OUTPATIENT
Start: 2024-09-17

## 2024-09-17 NOTE — H&P
History Of Present Illness  Marty Pierce is a 44 y.o. male presenting with GERD and colon cancer screening.     Past Medical History  Past Medical History:   Diagnosis Date    Acute pharyngitis, unspecified 07/31/2013    Sore throat    Allergic contact dermatitis due to plants, except food 10/05/2013    Contact dermatitis due to poison ivy    Male erectile dysfunction, unspecified     Inability to attain erection    Other conditions influencing health status 06/17/2014    Deficient knowledge of transurethral resection of the prostate (TURP)    Pain in right hip 06/05/2014    Right hip pain    Personal history of other diseases of the digestive system 06/05/2014    History of glossitis    Personal history of other mental and behavioral disorders 06/17/2014    History of depression    Unspecified convulsions (Multi) 06/17/2014    Seizures     Surgical History  Past Surgical History:   Procedure Laterality Date    HAND SURGERY Left      Social History  He reports that he has never smoked. He has never used smokeless tobacco. He reports current alcohol use of about 14.0 standard drinks of alcohol per week. He reports that he does not use drugs.    Family History  Family History   Problem Relation Name Age of Onset    Asthma Mother      FANNIE disease Mother      Hyperlipidemia Mother      FANNIE disease Father      Hyperlipidemia Father      Hypertension Father          Allergies  Allergies   Allergen Reactions    Grass Pollen Other     Eyes itching, nose running      Review of Systems     Physical Exam  Vitals and nursing note reviewed.   Constitutional:       Appearance: Normal appearance.   Cardiovascular:      Rate and Rhythm: Normal rate and regular rhythm.      Pulses: Normal pulses.      Heart sounds: Normal heart sounds.   Pulmonary:      Effort: Pulmonary effort is normal.      Breath sounds: Normal breath sounds.   Abdominal:      General: Abdomen is flat.      Palpations: Abdomen is soft.   Neurological:       Mental Status: He is alert.          Last Recorded Vitals  There were no vitals taken for this visit.    Assessment/Plan   GERD and colon cancer screening    Proceed with EGD and colon     Brijesh Moody MD

## 2024-09-18 ENCOUNTER — ANESTHESIA EVENT (OUTPATIENT)
Dept: GASTROENTEROLOGY | Facility: HOSPITAL | Age: 44
End: 2024-09-18
Payer: COMMERCIAL

## 2024-09-18 ENCOUNTER — HOSPITAL ENCOUNTER (OUTPATIENT)
Dept: GASTROENTEROLOGY | Facility: HOSPITAL | Age: 44
Discharge: HOME | End: 2024-09-18
Payer: COMMERCIAL

## 2024-09-18 ENCOUNTER — ANESTHESIA (OUTPATIENT)
Dept: GASTROENTEROLOGY | Facility: HOSPITAL | Age: 44
End: 2024-09-18
Payer: COMMERCIAL

## 2024-09-18 VITALS
WEIGHT: 169.75 LBS | BODY MASS INDEX: 25.14 KG/M2 | DIASTOLIC BLOOD PRESSURE: 79 MMHG | HEIGHT: 69 IN | SYSTOLIC BLOOD PRESSURE: 115 MMHG | RESPIRATION RATE: 17 BRPM | HEART RATE: 67 BPM | OXYGEN SATURATION: 99 % | TEMPERATURE: 97.5 F

## 2024-09-18 DIAGNOSIS — R13.19 ESOPHAGEAL DYSPHAGIA: ICD-10-CM

## 2024-09-18 DIAGNOSIS — Z12.11 ENCOUNTER FOR SCREENING COLONOSCOPY: ICD-10-CM

## 2024-09-18 DIAGNOSIS — K21.9 GASTROESOPHAGEAL REFLUX DISEASE, UNSPECIFIED WHETHER ESOPHAGITIS PRESENT: ICD-10-CM

## 2024-09-18 PROCEDURE — A45385 PR COLONOSCOPY,REMV LESN,SNARE: Performed by: ANESTHESIOLOGIST ASSISTANT

## 2024-09-18 PROCEDURE — 43239 EGD BIOPSY SINGLE/MULTIPLE: CPT | Performed by: INTERNAL MEDICINE

## 2024-09-18 PROCEDURE — 3700000001 HC GENERAL ANESTHESIA TIME - INITIAL BASE CHARGE

## 2024-09-18 PROCEDURE — 3700000002 HC GENERAL ANESTHESIA TIME - EACH INCREMENTAL 1 MINUTE

## 2024-09-18 PROCEDURE — 2500000004 HC RX 250 GENERAL PHARMACY W/ HCPCS (ALT 636 FOR OP/ED): Performed by: ANESTHESIOLOGIST ASSISTANT

## 2024-09-18 PROCEDURE — 7100000010 HC PHASE TWO TIME - EACH INCREMENTAL 1 MINUTE

## 2024-09-18 PROCEDURE — 45385 COLONOSCOPY W/LESION REMOVAL: CPT | Performed by: INTERNAL MEDICINE

## 2024-09-18 PROCEDURE — A45385 PR COLONOSCOPY,REMV LESN,SNARE: Performed by: ANESTHESIOLOGY

## 2024-09-18 PROCEDURE — 7100000009 HC PHASE TWO TIME - INITIAL BASE CHARGE

## 2024-09-18 PROCEDURE — 2500000005 HC RX 250 GENERAL PHARMACY W/O HCPCS: Performed by: ANESTHESIOLOGIST ASSISTANT

## 2024-09-18 RX ORDER — FENTANYL CITRATE 50 UG/ML
INJECTION, SOLUTION INTRAMUSCULAR; INTRAVENOUS AS NEEDED
Status: DISCONTINUED | OUTPATIENT
Start: 2024-09-18 | End: 2024-09-18

## 2024-09-18 RX ORDER — MIDAZOLAM HYDROCHLORIDE 1 MG/ML
INJECTION INTRAMUSCULAR; INTRAVENOUS AS NEEDED
Status: DISCONTINUED | OUTPATIENT
Start: 2024-09-18 | End: 2024-09-18

## 2024-09-18 RX ORDER — PROPOFOL 10 MG/ML
INJECTION, EMULSION INTRAVENOUS AS NEEDED
Status: DISCONTINUED | OUTPATIENT
Start: 2024-09-18 | End: 2024-09-18

## 2024-09-18 RX ORDER — LIDOCAINE HYDROCHLORIDE 20 MG/ML
INJECTION, SOLUTION INFILTRATION; PERINEURAL AS NEEDED
Status: DISCONTINUED | OUTPATIENT
Start: 2024-09-18 | End: 2024-09-18

## 2024-09-18 ASSESSMENT — PAIN SCALES - GENERAL
PAINLEVEL_OUTOF10: 0 - NO PAIN

## 2024-09-18 ASSESSMENT — COLUMBIA-SUICIDE SEVERITY RATING SCALE - C-SSRS
1. IN THE PAST MONTH, HAVE YOU WISHED YOU WERE DEAD OR WISHED YOU COULD GO TO SLEEP AND NOT WAKE UP?: NO
6. HAVE YOU EVER DONE ANYTHING, STARTED TO DO ANYTHING, OR PREPARED TO DO ANYTHING TO END YOUR LIFE?: NO
2. HAVE YOU ACTUALLY HAD ANY THOUGHTS OF KILLING YOURSELF?: NO

## 2024-09-18 ASSESSMENT — PAIN - FUNCTIONAL ASSESSMENT: PAIN_FUNCTIONAL_ASSESSMENT: 0-10

## 2024-09-18 NOTE — ANESTHESIA PREPROCEDURE EVALUATION
Patient: Marty Pierce    Procedure Information       Date/Time: 09/18/24 1250    Scheduled providers: Brijesh Moody MD; Srinivas Bolanos MD; ALFREDO Moya    Procedures:       COLONOSCOPY      EGD    Location: [facility]            Relevant Problems   Pulmonary   (+) Dyspnea on exertion      Neuro   (+) Seizure (Multi)       Clinical information reviewed:   Tobacco  Allergies  Meds   Med Hx  Surg Hx   Fam Hx  Soc Hx        NPO Detail:  NPO/Void Status  Date of Last Liquid: 09/18/24  Time of Last Liquid: 1220  Date of Last Solid: 09/17/24  Time of Last Solid: 0800         Physical Exam    Airway  Mallampati: II  TM distance: >3 FB  Neck ROM: full     Cardiovascular - normal exam     Dental - normal exam     Pulmonary - normal exam     Abdominal - normal exam             Anesthesia Plan    History of general anesthesia?: yes  History of complications of general anesthesia?: no    ASA 2     MAC     Anesthetic plan and risks discussed with patient.    Plan discussed with CAA.

## 2024-09-18 NOTE — DISCHARGE INSTRUCTIONS

## 2024-09-19 NOTE — ANESTHESIA POSTPROCEDURE EVALUATION
Patient: Marty Pierce    Procedure Summary       Date: 09/18/24 Room / Location: [facility]    Anesthesia Start: 1426 Anesthesia Stop: 1508    Procedures:       COLONOSCOPY      EGD Diagnosis:       Encounter for screening colonoscopy      Gastroesophageal reflux disease, unspecified whether esophagitis present      Esophageal dysphagia    Scheduled Providers: Brijesh Moody MD; Srinivas Bolanos MD; ALFREDO Moya Responsible Provider: Srinivas Bolanos MD    Anesthesia Type: MAC ASA Status: 2            Anesthesia Type: MAC    Vitals Value Taken Time   /79 09/18/24 1534   Temp 36.4 °C (97.5 °F) 09/18/24 1504   Pulse 64 09/18/24 1534   Resp 17 09/18/24 1534   SpO2 99 % 09/18/24 1534   Vitals shown include unfiled device data.    Anesthesia Post Evaluation    Patient location during evaluation: PACU  Patient participation: complete - patient participated  Level of consciousness: awake  Pain management: adequate  Airway patency: patent  Cardiovascular status: acceptable  Respiratory status: acceptable  Hydration status: acceptable  Postoperative Nausea and Vomiting: none        No notable events documented.

## 2024-09-26 LAB
LAB AP ASR DISCLAIMER: NORMAL
LABORATORY COMMENT REPORT: NORMAL
PATH REPORT.FINAL DX SPEC: NORMAL
PATH REPORT.GROSS SPEC: NORMAL
PATH REPORT.TOTAL CANCER: NORMAL

## 2024-10-02 ENCOUNTER — DOCUMENTATION (OUTPATIENT)
Dept: GASTROENTEROLOGY | Facility: CLINIC | Age: 44
End: 2024-10-02
Payer: COMMERCIAL

## 2024-10-02 DIAGNOSIS — B96.81 HELICOBACTER PYLORI GASTRITIS: Primary | ICD-10-CM

## 2024-10-02 DIAGNOSIS — K29.70 HELICOBACTER PYLORI GASTRITIS: Primary | ICD-10-CM

## 2024-10-02 LAB
ELECTRONICALLY SIGNED BY: NORMAL
H. PYLORI DRUG SUSCEPTIBILITY RESULTS: NORMAL

## 2024-10-02 RX ORDER — AMOXICILLIN 500 MG/1
1000 CAPSULE ORAL 2 TIMES DAILY
Qty: 56 CAPSULE | Refills: 0 | Status: SHIPPED | OUTPATIENT
Start: 2024-10-02 | End: 2024-10-16

## 2024-10-02 RX ORDER — OMEPRAZOLE 20 MG/1
20 CAPSULE, DELAYED RELEASE ORAL
Qty: 28 CAPSULE | Refills: 0 | Status: SHIPPED | OUTPATIENT
Start: 2024-10-02 | End: 2024-10-16

## 2024-10-02 RX ORDER — CLARITHROMYCIN 500 MG/1
500 TABLET, FILM COATED ORAL 2 TIMES DAILY
Qty: 28 TABLET | Refills: 0 | Status: SHIPPED | OUTPATIENT
Start: 2024-10-02 | End: 2024-10-16

## 2024-10-02 NOTE — PROGRESS NOTES
Called pt regarding 9/18/24 EGD and colonoscopy. EGD noted H pylori gastritis. Colonoscopy noted 2 small benign non adenomatous colon polyps which were removed. He is no longer taking omeprazole.     PLAN:  - treat H pylori with clarithromycin triple therapy for 14 days per antibiotic susceptibility genotype assay (clarithromycin, amoxicillin, omeprazole)  - obtain H pylori breath test 6-8 weeks post treatment to evaluate for eradication  - recommend screening colonoscopy in 10 years

## 2024-10-29 ENCOUNTER — APPOINTMENT (OUTPATIENT)
Dept: GASTROENTEROLOGY | Facility: CLINIC | Age: 44
End: 2024-10-29
Payer: COMMERCIAL

## 2025-01-11 NOTE — PROGRESS NOTES
Subjective   Patient ID: Marty Pierce is a 44 y.o. male    Chief Complaint: No chief complaint on file.       Last Surgery: No surgery found  Date of Last Surgery: No surgery found    HPI  Marty Pierce is a 44 y.o. left hand dominant male presenting for left shoulder pain. He is currently a .    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 He has worse pain with overhead activities and throwing. He does practice throwing with his girlfriend's daughter. He has had no injections or therapy. He does not take anything for pain right now. The pain is worse at night.       Objective   Patient is a well-developed, well-nourished male  in no acute distress.  Breathes with normal chest rises.  Pupils are round and symmetric today.  Awake, alert, and oriented x3.      Examination of the left shoulder today reveals the skin to be intact. There is no sign of any atrophy, lesions, or abrasions. There is no pain to palpation of the bony prominences. Cervical lymphadenopathy examined, and this was negative. Patient had 5 out of 5 wrist flexion, extension, and thumb extension bilaterally. Sensation was intact to light touch to median, ulnar, radial axillary, and musculocutaneous nerves bilaterally. Positive radial pulse bilaterally. Range of motion of the left shoulder revealed 0-170° of forward elevation, 0-60° of external rotation, and internal rotation was to T-12. +Jobes 5/5. +Hawkin's. +O'kg's. - modified belly press test.     Examination of the right shoulder today reveals the skin to be intact. There is no sign of any atrophy, lesions, or abrasions. There is no pain to palpation of the bony prominences. Cervical lymphadenopathy examined, and this was negative. Patient had 5 out of 5 wrist flexion, extension, and thumb extension, bilaterally. Sensation was intact to light touch to median, ulnar, radial, axillary, and musculocutaneous nerves bilaterally. Positive  radial pulse bilaterally. Provocative maneuvers are negative today. Range of motion of the right shoulder revealed 0-170° of forward elevation, 0-60° of external rotation, and internal rotation up to T-12.    Imaging:    L Inj/Asp: L subacromial bursa on 1/14/2025 9:56 AM  Indications: pain  Details: 20 G needle, anterior approach  Medications: 40 mg triamcinolone acetonide 40 mg/mL; 4 mL lidocaine 10 mg/mL (1 %)  Outcome: tolerated well, no immediate complications  Procedure, treatment alternatives, risks and benefits explained, specific risks discussed. Consent was given by the patient. Immediately prior to procedure a time out was called to verify the correct patient, procedure, equipment, support staff and site/side marked as required. Patient was prepped and draped in the usual sterile fashion.             Assessment/Plan   No diagnosis found.  Patient with likely SLAP injury    At this point we a long discussion about their options. We discussed the fact that a course of nonoperative management including provider directed home therapy, anti-inflammatories and possible injections in 4-6 weeks if no better would be the best course of action. I reassured him that rest and focusing on scapular stabilizers and shoulder muscles would be the best way to go about this. He was given home exercises with correlating website today.      In general most patients who come in with complaints such as these will get better with home therapy, NSAIDs and injections alone. The therapy should be performed 2-3 times a day and should take about 10-15 minutes to perform each time. We have given the patient our written provider directed home exercise program, along with correlating website for home therapy. A physical therapy prescription and  physical therapy locations was also offered to the patient today.      We will see how they do with conservative management for the next 4-6 weeks. Ultimately if he gets little or no relief  over the next few weeks and fails home therapy, modification of activity and OTC medication, we will revisit a possible indication of MR arthrogram of shoulder if injection fails to give consistent pain relief.      Patient can be seen again in clinic in 4-6 weeks for a repeat clinical check if needed. If symptoms improve, they can see us back on an as-needed basis. All patient's questions were answered to their satisfaction today and they are comfortable with the above plan.      He tolerated the injection well today in the LEFT shoulder. We will see how they do after the injection. They are aware that there can be a spike in glucose levels following an injection.    If they do not get sustained relief from the injection, consideration for a repeat injection versus advanced imaging of an MRI will be warranted as then they would have failed over 6 weeks of conservative management of injections, therapy, rest and over-the-counter pain medications.      If he calls back and lets us know that he has had recurrence of his symptoms, we will order the MRI as he has failed conservative management including physical therapy and injections.     No orders of the defined types were placed in this encounter.        Follow up in 6 weeks    Scribe Attestation  By signing my name below, Kika MCGREGOR Scribe   attest that this documentation has been prepared under the direction and in the presence of Oj Gibbs MD.

## 2025-01-14 ENCOUNTER — OFFICE VISIT (OUTPATIENT)
Dept: ORTHOPEDIC SURGERY | Facility: HOSPITAL | Age: 45
End: 2025-01-14
Payer: COMMERCIAL

## 2025-01-14 ENCOUNTER — HOSPITAL ENCOUNTER (OUTPATIENT)
Dept: RADIOLOGY | Facility: HOSPITAL | Age: 45
Discharge: HOME | End: 2025-01-14
Payer: COMMERCIAL

## 2025-01-14 DIAGNOSIS — M25.511 RIGHT SHOULDER PAIN, UNSPECIFIED CHRONICITY: Primary | ICD-10-CM

## 2025-01-14 DIAGNOSIS — M25.511 RIGHT SHOULDER PAIN, UNSPECIFIED CHRONICITY: ICD-10-CM

## 2025-01-14 DIAGNOSIS — M25.512 CHRONIC LEFT SHOULDER PAIN: ICD-10-CM

## 2025-01-14 DIAGNOSIS — G89.29 CHRONIC LEFT SHOULDER PAIN: ICD-10-CM

## 2025-01-14 PROCEDURE — 99204 OFFICE O/P NEW MOD 45 MIN: CPT | Performed by: ORTHOPAEDIC SURGERY

## 2025-01-14 PROCEDURE — 73030 X-RAY EXAM OF SHOULDER: CPT | Mod: LEFT SIDE | Performed by: RADIOLOGY

## 2025-01-14 PROCEDURE — 20610 DRAIN/INJ JOINT/BURSA W/O US: CPT | Mod: LT | Performed by: ORTHOPAEDIC SURGERY

## 2025-01-14 PROCEDURE — 73030 X-RAY EXAM OF SHOULDER: CPT | Mod: LT

## 2025-01-14 PROCEDURE — 2500000004 HC RX 250 GENERAL PHARMACY W/ HCPCS (ALT 636 FOR OP/ED): Performed by: ORTHOPAEDIC SURGERY

## 2025-01-14 PROCEDURE — 99214 OFFICE O/P EST MOD 30 MIN: CPT | Mod: 25 | Performed by: ORTHOPAEDIC SURGERY

## 2025-01-14 RX ADMIN — TRIAMCINOLONE ACETONIDE 40 MG: 200 INJECTION, SUSPENSION INTRA-ARTICULAR; INTRAMUSCULAR at 09:56

## 2025-01-14 RX ADMIN — LIDOCAINE HYDROCHLORIDE 4 ML: 10 INJECTION, SOLUTION INFILTRATION; PERINEURAL at 09:56

## 2025-01-18 RX ORDER — TRIAMCINOLONE ACETONIDE 40 MG/ML
40 INJECTION, SUSPENSION INTRA-ARTICULAR; INTRAMUSCULAR
Status: COMPLETED | OUTPATIENT
Start: 2025-01-14 | End: 2025-01-14

## 2025-01-18 RX ORDER — LIDOCAINE HYDROCHLORIDE 10 MG/ML
4 INJECTION, SOLUTION INFILTRATION; PERINEURAL
Status: COMPLETED | OUTPATIENT
Start: 2025-01-14 | End: 2025-01-14

## 2025-05-05 ENCOUNTER — OFFICE VISIT (OUTPATIENT)
Dept: PRIMARY CARE | Facility: HOSPITAL | Age: 45
End: 2025-05-05
Payer: COMMERCIAL

## 2025-05-05 VITALS
HEART RATE: 80 BPM | HEIGHT: 69 IN | SYSTOLIC BLOOD PRESSURE: 117 MMHG | OXYGEN SATURATION: 99 % | BODY MASS INDEX: 24.88 KG/M2 | DIASTOLIC BLOOD PRESSURE: 76 MMHG | WEIGHT: 168 LBS | TEMPERATURE: 97.1 F

## 2025-05-05 DIAGNOSIS — Z11.4 SCREENING FOR HIV (HUMAN IMMUNODEFICIENCY VIRUS): ICD-10-CM

## 2025-05-05 DIAGNOSIS — N52.9 ERECTILE DYSFUNCTION, UNSPECIFIED ERECTILE DYSFUNCTION TYPE: ICD-10-CM

## 2025-05-05 DIAGNOSIS — R56.9 SEIZURE (MULTI): Primary | ICD-10-CM

## 2025-05-05 DIAGNOSIS — Z12.5 PROSTATE CANCER SCREENING: ICD-10-CM

## 2025-05-05 DIAGNOSIS — Z11.59 NEED FOR HEPATITIS C SCREENING TEST: ICD-10-CM

## 2025-05-05 PROCEDURE — 3008F BODY MASS INDEX DOCD: CPT | Performed by: INTERNAL MEDICINE

## 2025-05-05 PROCEDURE — 1036F TOBACCO NON-USER: CPT | Performed by: INTERNAL MEDICINE

## 2025-05-05 PROCEDURE — 99203 OFFICE O/P NEW LOW 30 MIN: CPT | Performed by: INTERNAL MEDICINE

## 2025-05-05 PROCEDURE — 99213 OFFICE O/P EST LOW 20 MIN: CPT | Performed by: INTERNAL MEDICINE

## 2025-05-05 RX ORDER — SILDENAFIL 50 MG/1
50 TABLET, FILM COATED ORAL AS NEEDED
Qty: 30 TABLET | Refills: 3 | Status: SHIPPED | OUTPATIENT
Start: 2025-05-05 | End: 2026-05-05

## 2025-05-05 ASSESSMENT — ENCOUNTER SYMPTOMS
NAUSEA: 0
MYALGIAS: 0
WEAKNESS: 0
ACTIVITY CHANGE: 0
PALPITATIONS: 0
SLEEP DISTURBANCE: 0
CHILLS: 0
DIARRHEA: 0
SHORTNESS OF BREATH: 0
SEIZURES: 1
CHEST TIGHTNESS: 0
DEPRESSION: 1
AGITATION: 0
HEADACHES: 0

## 2025-05-05 ASSESSMENT — PATIENT HEALTH QUESTIONNAIRE - PHQ9
2. FEELING DOWN, DEPRESSED OR HOPELESS: MORE THAN HALF THE DAYS
8. MOVING OR SPEAKING SO SLOWLY THAT OTHER PEOPLE COULD HAVE NOTICED. OR THE OPPOSITE, BEING SO FIGETY OR RESTLESS THAT YOU HAVE BEEN MOVING AROUND A LOT MORE THAN USUAL: NOT AT ALL
6. FEELING BAD ABOUT YOURSELF - OR THAT YOU ARE A FAILURE OR HAVE LET YOURSELF OR YOUR FAMILY DOWN: MORE THAN HALF THE DAYS
4. FEELING TIRED OR HAVING LITTLE ENERGY: NEARLY EVERY DAY
SUM OF ALL RESPONSES TO PHQ QUESTIONS 1-9: 15
9. THOUGHTS THAT YOU WOULD BE BETTER OFF DEAD, OR OF HURTING YOURSELF: NOT AT ALL
5. POOR APPETITE OR OVEREATING: NEARLY EVERY DAY
10. IF YOU CHECKED OFF ANY PROBLEMS, HOW DIFFICULT HAVE THESE PROBLEMS MADE IT FOR YOU TO DO YOUR WORK, TAKE CARE OF THINGS AT HOME, OR GET ALONG WITH OTHER PEOPLE: NOT DIFFICULT AT ALL
SUM OF ALL RESPONSES TO PHQ9 QUESTIONS 1 AND 2: 4
1. LITTLE INTEREST OR PLEASURE IN DOING THINGS: MORE THAN HALF THE DAYS
7. TROUBLE CONCENTRATING ON THINGS, SUCH AS READING THE NEWSPAPER OR WATCHING TELEVISION: NOT AT ALL
3. TROUBLE FALLING OR STAYING ASLEEP OR SLEEPING TOO MUCH: NEARLY EVERY DAY

## 2025-05-05 ASSESSMENT — PAIN SCALES - GENERAL: PAINLEVEL_OUTOF10: 0-NO PAIN

## 2025-05-05 NOTE — ASSESSMENT & PLAN NOTE
Orders:    sildenafil (Viagra) 50 mg tablet; Take 1 tablet (50 mg) by mouth if needed for erectile dysfunction. 1 HOUR BEFORE Activity

## 2025-05-05 NOTE — PROGRESS NOTES
"Subjective   Patient ID: Mateo Pierce is a 45 y.o. male who presents for Establish Care.  Exercises fairly often. His girlfriend thought he had a seizure during the night.  He also had a post-ictal. History of a seizure as a child.  He is not overly concerned but his girlfriend has.  He does not have any symptoms during the day.  He has a very stressful situation at work right now, and he believes it is related to stress.    HPI     Review of Systems   Constitutional:  Negative for activity change and chills.   HENT:  Negative for congestion.    Respiratory:  Negative for chest tightness and shortness of breath.    Cardiovascular:  Negative for chest pain and palpitations.   Gastrointestinal:  Negative for diarrhea and nausea.   Musculoskeletal:  Negative for myalgias.   Neurological:  Positive for seizures. Negative for weakness and headaches.   Psychiatric/Behavioral:  Negative for agitation, behavioral problems and sleep disturbance.        Objective   /76 (BP Location: Left arm, Patient Position: Sitting, BP Cuff Size: Large adult)   Pulse 80   Temp 36.2 °C (97.1 °F) (Temporal)   Ht 1.753 m (5' 9\")   Wt 76.2 kg (168 lb)   SpO2 99%   BMI 24.81 kg/m²     Physical Exam  Constitutional:       Appearance: Normal appearance.   HENT:      Head: Normocephalic and atraumatic.      Nose: Nose normal.      Mouth/Throat:      Mouth: Mucous membranes are moist.   Eyes:      Extraocular Movements: Extraocular movements intact.   Cardiovascular:      Rate and Rhythm: Normal rate and regular rhythm.   Pulmonary:      Effort: No respiratory distress.      Breath sounds: No wheezing.   Abdominal:      General: Bowel sounds are normal.      Palpations: Abdomen is soft.   Neurological:      General: No focal deficit present.         Assessment/Plan   Assessment & Plan  Seizure (Multi)  Will have him review with a neurologist.  Orders:    Referral to Neurology; Future    Comprehensive Metabolic Panel; Future    Erectile " dysfunction, unspecified erectile dysfunction type    Orders:    sildenafil (Viagra) 50 mg tablet; Take 1 tablet (50 mg) by mouth if needed for erectile dysfunction. 1 HOUR BEFORE Activity    Prostate cancer screening    Orders:    Prostate Specific Antigen; Future    Screening for HIV (human immunodeficiency virus)    Orders:    HIV 1/2 Antigen/Antibody Screen with Reflex to Confirmation; Future    Need for hepatitis C screening test    Orders:    Hepatitis C Antibody; Future

## 2025-05-05 NOTE — ASSESSMENT & PLAN NOTE
Will have him review with a neurologist.  Orders:    Referral to Neurology; Future    Comprehensive Metabolic Panel; Future

## 2025-05-06 LAB
HCV AB SERPL QL IA: NORMAL
HIV 1+2 AB+HIV1 P24 AG SERPL QL IA: NORMAL
PSA SERPL-MCNC: 0.39 NG/ML

## 2025-05-21 ENCOUNTER — OFFICE VISIT (OUTPATIENT)
Dept: URGENT CARE | Age: 45
End: 2025-05-21
Payer: COMMERCIAL

## 2025-05-21 VITALS
HEIGHT: 69 IN | SYSTOLIC BLOOD PRESSURE: 129 MMHG | BODY MASS INDEX: 24.73 KG/M2 | WEIGHT: 167 LBS | HEART RATE: 95 BPM | RESPIRATION RATE: 18 BRPM | DIASTOLIC BLOOD PRESSURE: 92 MMHG | TEMPERATURE: 99.7 F | OXYGEN SATURATION: 99 %

## 2025-05-21 DIAGNOSIS — J06.9 VIRAL UPPER RESPIRATORY TRACT INFECTION: Primary | ICD-10-CM

## 2025-05-21 DIAGNOSIS — R68.89 FLU-LIKE SYMPTOMS: ICD-10-CM

## 2025-05-21 LAB
POC CORONAVIRUS SARS-COV-2 PCR: NEGATIVE
POC HUMAN RHINOVIRUS PCR: NEGATIVE
POC INFLUENZA A VIRUS PCR: NEGATIVE
POC INFLUENZA B VIRUS PCR: NEGATIVE
POC RESPIRATORY SYNCYTIAL VIRUS PCR: NEGATIVE

## 2025-05-21 RX ORDER — BROMPHENIRAMINE MALEATE, PSEUDOEPHEDRINE HYDROCHLORIDE, AND DEXTROMETHORPHAN HYDROBROMIDE 2; 30; 10 MG/5ML; MG/5ML; MG/5ML
10 SYRUP ORAL 4 TIMES DAILY PRN
Qty: 250 ML | Refills: 0 | Status: SHIPPED | OUTPATIENT
Start: 2025-05-21 | End: 2025-05-31

## 2025-05-21 RX ORDER — AZITHROMYCIN 250 MG/1
TABLET, FILM COATED ORAL
Qty: 6 TABLET | Refills: 0 | Status: SHIPPED | OUTPATIENT
Start: 2025-05-21

## 2025-05-21 ASSESSMENT — ENCOUNTER SYMPTOMS: SINUS COMPLAINT: 1

## 2025-05-21 ASSESSMENT — PAIN SCALES - GENERAL: PAINLEVEL_OUTOF10: 4

## 2025-05-21 NOTE — PROGRESS NOTES
"Subjective   Patient ID: Marty Pierce \"Mateo\" is a 45 y.o. male. They present today with a chief complaint of Sinus Problem (Sinus congestion/cough/runnny nose/headache x 5 days).    History of Present Illness  Patient is a pleasant 45-year-old white male, no significant past medical history, presented to clinic with chief complaint of upper respiratory congestion.  Patient is reporting 4-day history of upper respiratory congestion rhinorrhea postnasal drainage and cough.  He denies any chest pain or shortness of breath.  No ear pain.  Does report a mild sore throat with no dysphagia odynophagia trismus drooling or change in voice.  No abdominal pain, nausea, vomiting.  No rashes.  States he is eating and drinking well without difficulty.  No further complaints at this time.      Sinus Problem      Past Medical History  Allergies as of 05/21/2025 - Reviewed 05/21/2025   Allergen Reaction Noted    Grass pollen Other 03/18/2024       Prescriptions Prior to Admission[1]       Medical History[2]    Surgical History[3]     reports that he has never smoked. He has never used smokeless tobacco. He reports current alcohol use of about 14.0 standard drinks of alcohol per week. He reports that he does not use drugs.    Review of Systems  Review of Systems                               Objective    Vitals:    05/21/25 0826 05/21/25 0829   BP: (!) 130/97 (!) 129/92   BP Location: Left arm Left arm   Patient Position: Sitting Sitting   Pulse: (!) 114 95   Resp: 18    Temp: 37.6 °C (99.7 °F)    TempSrc: Oral    SpO2: 99%    Weight: 75.8 kg (167 lb)    Height: 1.753 m (5' 9\")      No LMP for male patient.    Physical Exam    General: Vitals Noted. No distress. Normocephalic.     HEENT: TMs normal, EOMI, normal conjunctiva, patent nares with erythematous edematous and appear nasal turbinates and clear rhinorrhea bilaterally.  Posterior oropharynx with signs of postnasal drainage without any erythema swelling or tonsillar exudate.  " Uvula is in the midline and nonedematous.  No drooling.  No trismus.    Neck: Supple with no adenopathy.     Cardiac: Regular Rate and Rhythm. No murmur.     Pulmonary: Equal breath sounds bilaterally. No wheezes, rhonchi, or rales.    Abdomen: Soft, non-tender, with normal bowel sounds.     Musculoskeletal: Moves all extremities, no effusion, no edema.     Skin: No obvious rashes.    Procedures    Point of Care Test & Imaging Results from this visit  Results for orders placed or performed in visit on 05/21/25   POCT SPOTFIRE R/ST Panel Mini w/COVID ([facility]) manually resulted    Collection Time: 05/21/25  8:58 AM    Specimen: Swab   Result Value Ref Range    POC Sars-Cov-2 PCR Negative Negative    POC Respiratory Syncytial Virus PCR Negative Negative    POC Influenza A Virus PCR Negative Negative    POC Influenza B Virus PCR Negative Negative    POC Human Rhinovirus PCR Negative Negative      Imaging  No results found.    Cardiology, Vascular, and Other Imaging  No other imaging results found for the past 2 days      Diagnostic study results (if any) were reviewed by Kishan Ashraf PA-C.    Assessment/Plan   Allergies, medications, history, and pertinent labs/EKGs/Imaging reviewed by Kishan Ashraf PA-C.     Medical Decision Making  Patient was seen eval in the clinic for chief complaint of upper respiratory congestion and rhinorrhea.  On exam patient is nontoxic very well-appearing resting comfortably no acute distress.  Vital signs are stable, afebrile.  Chest is clear, heart is regular, belly is diffusely soft and nontender.  ENT examination as above consistent with a viral illness.  BioFire testing was performed and returned negative.  I do feel this is a viral upper respiratory infection given duration of symptoms and physical exam.  Will advise Bromfed-DM to use as needed for his cough and congestion.  Advised drink plenty of clear fluids rest use Tylenol or Profen as needed.  I provided  prescription for Z-Chase instructions to watch weight monitor symptoms and if not improving in the next week initiate the antibiotic as needed.  Discharge home at this time.  Reviewed my impression, plan, strict return was report to ED precautions with the patient.  He expresses understanding and agreement plan of care.      Orders and Diagnoses  Diagnoses and all orders for this visit:  Viral upper respiratory tract infection  -     azithromycin (Zithromax) 250 mg tablet; Take 2 tablets for one day then 1 tablet per day for 4 days  -     brompheniramine-pseudoeph-DM 2-30-10 mg/5 mL syrup; Take 10 mL by mouth 4 times a day as needed for congestion or cough for up to 10 days.  Flu-like symptoms  -     POCT SPOTFIRE R/ST Panel Mini w/COVID [facility] manually resulted        Medical Admin Record      Follow Up Instructions  No follow-ups on file.    Patient disposition: Home    Electronically signed by Kishan Ashraf PA-C  9:05 AM         [1] (Not in a hospital admission)  [2]   Past Medical History:  Diagnosis Date    Acute pharyngitis, unspecified 07/31/2013    Sore throat    Allergic contact dermatitis due to plants, except food 10/05/2013    Contact dermatitis due to poison ivy    Male erectile dysfunction, unspecified     Inability to attain erection    Other conditions influencing health status 06/17/2014    Deficient knowledge of transurethral resection of the prostate (TURP)    Pain in right hip 06/05/2014    Right hip pain    Personal history of other diseases of the digestive system 06/05/2014    History of glossitis    Personal history of other mental and behavioral disorders 06/17/2014    History of depression    Unspecified convulsions (Multi) 06/17/2014    Seizures   [3]   Past Surgical History:  Procedure Laterality Date    HAND SURGERY Left

## 2025-08-26 ENCOUNTER — OFFICE VISIT (OUTPATIENT)
Dept: NEUROLOGY | Facility: CLINIC | Age: 45
End: 2025-08-26
Payer: COMMERCIAL

## 2025-08-26 VITALS
DIASTOLIC BLOOD PRESSURE: 88 MMHG | HEART RATE: 86 BPM | RESPIRATION RATE: 16 BRPM | SYSTOLIC BLOOD PRESSURE: 127 MMHG | BODY MASS INDEX: 24.81 KG/M2 | WEIGHT: 168 LBS

## 2025-08-26 DIAGNOSIS — R56.9 SEIZURES (MULTI): Primary | ICD-10-CM

## 2025-08-26 PROCEDURE — 1036F TOBACCO NON-USER: CPT | Performed by: STUDENT IN AN ORGANIZED HEALTH CARE EDUCATION/TRAINING PROGRAM

## 2025-08-26 PROCEDURE — 99202 OFFICE O/P NEW SF 15 MIN: CPT

## 2025-08-26 PROCEDURE — 99212 OFFICE O/P EST SF 10 MIN: CPT

## 2025-08-26 PROCEDURE — 99204 OFFICE O/P NEW MOD 45 MIN: CPT | Performed by: STUDENT IN AN ORGANIZED HEALTH CARE EDUCATION/TRAINING PROGRAM

## 2025-08-26 RX ORDER — LAMOTRIGINE 25 MG/1
TABLET ORAL
Qty: 422 TABLET | Refills: 0 | Status: SHIPPED | OUTPATIENT
Start: 2025-08-26 | End: 2025-11-20

## 2025-08-26 ASSESSMENT — PATIENT HEALTH QUESTIONNAIRE - PHQ9
2. FEELING DOWN, DEPRESSED OR HOPELESS: NOT AT ALL
SUM OF ALL RESPONSES TO PHQ9 QUESTIONS 1 AND 2: 0
1. LITTLE INTEREST OR PLEASURE IN DOING THINGS: NOT AT ALL

## 2025-08-26 ASSESSMENT — ENCOUNTER SYMPTOMS
SEIZURES: 1
DEPRESSION: 0
LOSS OF SENSATION IN FEET: 0
OCCASIONAL FEELINGS OF UNSTEADINESS: 0